# Patient Record
Sex: FEMALE | Race: BLACK OR AFRICAN AMERICAN
[De-identification: names, ages, dates, MRNs, and addresses within clinical notes are randomized per-mention and may not be internally consistent; named-entity substitution may affect disease eponyms.]

---

## 2018-06-22 ENCOUNTER — HOSPITAL ENCOUNTER (OUTPATIENT)
Dept: HOSPITAL 92 - LABBT | Age: 43
Discharge: HOME | End: 2018-06-22
Attending: OBSTETRICS & GYNECOLOGY
Payer: COMMERCIAL

## 2018-06-22 DIAGNOSIS — D25.9: ICD-10-CM

## 2018-06-22 DIAGNOSIS — N92.0: ICD-10-CM

## 2018-06-22 DIAGNOSIS — Z01.812: Primary | ICD-10-CM

## 2018-06-22 LAB
HGB BLD-MCNC: 8.5 G/DL (ref 12–16)
MCH RBC QN AUTO: 18.2 PG (ref 27–31)
MCV RBC AUTO: 60.7 FL (ref 78–98)
PLATELET # BLD AUTO: 523 THOU/UL (ref 130–400)
PREGS CONTROL BACKGROUND?: (no result)
PREGS CONTROL BAR APPEAR?: YES
RBC # BLD AUTO: 4.64 MILL/UL (ref 4.2–5.4)
WBC # BLD AUTO: 6.4 THOU/UL (ref 4.8–10.8)

## 2018-06-22 PROCEDURE — 86900 BLOOD TYPING SEROLOGIC ABO: CPT

## 2018-06-22 PROCEDURE — 85027 COMPLETE CBC AUTOMATED: CPT

## 2018-06-22 PROCEDURE — 86901 BLOOD TYPING SEROLOGIC RH(D): CPT

## 2018-06-22 PROCEDURE — 84703 CHORIONIC GONADOTROPIN ASSAY: CPT

## 2018-06-22 PROCEDURE — 86850 RBC ANTIBODY SCREEN: CPT

## 2018-06-25 ENCOUNTER — HOSPITAL ENCOUNTER (OUTPATIENT)
Dept: HOSPITAL 92 - SDC | Age: 43
LOS: 1 days | Discharge: HOME | End: 2018-06-26
Attending: OBSTETRICS & GYNECOLOGY
Payer: COMMERCIAL

## 2018-06-25 VITALS — BODY MASS INDEX: 28.8 KG/M2

## 2018-06-25 DIAGNOSIS — N83.8: ICD-10-CM

## 2018-06-25 DIAGNOSIS — N73.6: ICD-10-CM

## 2018-06-25 DIAGNOSIS — D64.9: ICD-10-CM

## 2018-06-25 DIAGNOSIS — D25.9: Primary | ICD-10-CM

## 2018-06-25 DIAGNOSIS — N84.0: ICD-10-CM

## 2018-06-25 PROCEDURE — 88307 TISSUE EXAM BY PATHOLOGIST: CPT

## 2018-06-25 PROCEDURE — 86901 BLOOD TYPING SEROLOGIC RH(D): CPT

## 2018-06-25 PROCEDURE — 85027 COMPLETE CBC AUTOMATED: CPT

## 2018-06-25 PROCEDURE — 86900 BLOOD TYPING SEROLOGIC ABO: CPT

## 2018-06-25 PROCEDURE — S0028 INJECTION, FAMOTIDINE, 20 MG: HCPCS

## 2018-06-25 PROCEDURE — 0UT94ZZ RESECTION OF UTERUS, PERCUTANEOUS ENDOSCOPIC APPROACH: ICD-10-PCS | Performed by: OBSTETRICS & GYNECOLOGY

## 2018-06-25 PROCEDURE — A4216 STERILE WATER/SALINE, 10 ML: HCPCS

## 2018-06-25 PROCEDURE — 96375 TX/PRO/DX INJ NEW DRUG ADDON: CPT

## 2018-06-25 PROCEDURE — 36415 COLL VENOUS BLD VENIPUNCTURE: CPT

## 2018-06-25 PROCEDURE — 0UT74ZZ RESECTION OF BILATERAL FALLOPIAN TUBES, PERCUTANEOUS ENDOSCOPIC APPROACH: ICD-10-PCS | Performed by: OBSTETRICS & GYNECOLOGY

## 2018-06-25 PROCEDURE — 86850 RBC ANTIBODY SCREEN: CPT

## 2018-06-25 PROCEDURE — A4306 DRUG DELIVERY SYSTEM <=50 ML: HCPCS

## 2018-06-25 PROCEDURE — 96374 THER/PROPH/DIAG INJ IV PUSH: CPT

## 2018-06-25 RX ADMIN — DOCUSATE CALCIUM SCH: 240 CAPSULE, LIQUID FILLED ORAL at 21:06

## 2018-06-25 RX ADMIN — Medication SCH: at 21:06

## 2018-06-25 NOTE — OP
DATE OF PROCEDURE:  2018

 

PREOPERATIVE DIAGNOSES:

1.  Uterine fibroids.

2.  Menometrorrhagia.

3.  Pelvic pain.

4.  Anemia.

 

POSTOPERATIVE DIAGNOSES:

1.  Uterine fibroids.

2.  Menometrorrhagia.

3.  Pelvic pain. 

4.  Anemia.

 

PROCEDURE:  Robotic-assisted total laparoscopic hysterectomy, bilateral salpingectomy.

 

ANESTHESIA:  General endotracheal.

 

ATTENDING SURGEON:  Cheli Gipson M.D.

 

ASSISTANT:  Kit Bauman M.D.

 

ESTIMATED BLOOD LOSS:  50 mL.

 

INTRAVENOUS FLUIDS:  1300 mL crystalloid.

 

URINE OUTPUT:  200 mL of clear urine.

 

COMPLICATIONS:  None.

 

PATHOLOGY:  Uterus, cervix, bilateral fallopian tubes.

 

DRAINS:  Paiz catheter.

 

FINDINGS:  Mobile 14-week size uterus sounded to 11 cm.  Normal uterine contours, normal appearing fa
llopian tubes and ovaries bilaterally.  Ureters were noted retroperitoneally throughout the entire ca
se vermiculating.  There were no bladder injury on backfilling of the bladder and excellent hemostasi
s on low pressure check.

 

OPERATIVE INDICATIONS:  A 42-year-old  with near constant vaginal bleeding and anemia, hemoglobi
n of 8.5 and uterine fibroids on ultrasound, that desired definitive management secondary to a submuc
ous location of fibroids and patient not being a good candidate for endometrial ablation and failing 
medical management with oral contraceptives and Lysteda.

 

OPERATIVE TECHNIQUE:  The patient was taken to the operating room where general anesthesia was obtain
ed without difficulty.  The patient was prepped and draped in sterile fashion in the dorsal lithotomy
 position.  A Paiz catheter was placed in the bladder; however, the 16 Fijian Piaz would not pass e
asily.  Therefore, a 14 Fijian Paiz was placed and 150 mL of urine returned.  The speculum was place
d in the vagina.  The anterior lip of the cervix was grasped with single tooth tenaculum.  The uterus
 then sounded to 11 cm and the MELVA manipulator was assembled with a 10 cm tip and a 4 cm colpotomize
r ring.  The manipulator tip was inserted to the uterine fundus.  The balloon was inflated.  The spec
ulum was removed and the colpotomizer ring was advanced to fit snugly around the cervix, an occluder 
balloon was then inflated.  Legs were placed in low lithotomy.  Attention was turned to the abdomen. 
 Approximately one fingerbreadth above the umbilicus was infiltrated with 0.5% Marcaine with epinephr
ine and a 12 mm skin incision was made.  The Veress needle was passed into the abdomen noting an open
ing pressure of 1 mmHg.  Pneumoperitoneum was obtained without difficulty.  A 12 mm trocar was passed
 into the abdomen and confirmed placement with robotic camera.  Steep Trendelenburg was obtained.  Ri
ght and left lower quadrant 8 mm robotic trocars were placed under direct visualization after infiltr
ating with 0.5% Marcaine with epinephrine.  A right upper quadrant assistant port that was 11 mm was 
also placed under direct visualization after infiltrating with anesthetic.  The robot was then docked
, the right robotic arm contained monopolar scissors, left robotic arm contained a fenestrated bipola
r.  The left fallopian tube was grasped and elevated.  There was a filmy adhesion to the epiploica th
at was transected with scissors.  The mesosalpinx was cauterized and transected and once the medial p
ortion was met, this was clamped across, cauterized and transected and removed out of the abdomen; ho
wever, the fallopian tube was dropped on the way out of removing it through the assistant port and th
e robotic camera was used to locate this.  However, during this process, the camera broke and this ne
cessitated undocking the camera, removing the instruments out of the abdomen, releasing pneumoperiton
eum and reassembling an additional robotic camera.  Once this was completed, the camera was redocked 
and the instruments were placed back into the abdomen.  The uteroovarian on the left side was cauteri
zed and transected and taken down to the round ligament that was cauterized and then portion transect
ed and opened up of the anterior and posterior leaf of the broad ligament.  The anterior leaf of the 
broad ligament was incised and tenting up and the retroperitoneum with the fenestrated bipolar down t
o the level of the bladder flap.  The posterior leaf of broad ligament was also dropped down to the l
evel of the uterosacral and the posterior retroperitoneal fibers were dissected through with scissors
, also blunt dissection push and spread with the fenestrated.  The uterine vessels were skeletonized 
on the left side and attention was turned to the right side of the uterus where the fallopian tube wa
s grasped and elevated.  A window was made in the mesosalpinx and the vessels were cauterized and the
 fallopian tube was then transected and removed out of the abdomen.  The utero-ovarian was cauterized
 x2 with the fenestrated and transected in the midportion taken down to the round ligament with the s
cissors and that was cauterized in the midportion and transected.  There were large vessels near the 
round ligament on the right side with slight bleeding; however, this was hemostatic with the fenestra
goyo bipolar.  The anterior leaf of the broad ligament was opened up with the scissors down to the lev
el of the incision from the contralateral side.  The posterior leaf of the broad ligament was also in
cised down to the level of the uterosacral and the ureter was noted on the right and the right pelvic
 sidewall and the retroperitoneum.  Further retroperitoneal dissection was performed bluntly and the 
uterine vessels on the right side were skeletonized with the scissors until adequate visualization wa
s noted.  The vessels on the right side were then clamped and cauterized at the level of the internal
 cervical os.  The bladder flap was then further created scoring on the pubocervical fascia and disse
cting down distally with the back end of the scissors.  The vessels on the left side were clamped and
 cauterized at the level of the internal cervical os.  Posterior colpotomy was performed and the colp
otomizer ring was noted.  Anterior colpotomy was then performed and the uterine pedicles remained.  T
hese were again clamped as close to the uterus as possible and transected at the level of the lower u
terine segment to allow the pedicle to fall well away from the vaginal cuff apex bilaterally.  Hemost
asis was noted.  The uterus was then removed into the vagina as a means to maintain pneumoperitoneum 
and the cuff was irrigated and hemostasis was achieved with the fenestrated.  The scissors were trade
d out for the needle  and the 2-0 barbed PDS suture was used to close the vaginal cuff in a run
susan fashion incorporating vaginal mucosa into each bite and posterior peritoneum.  This was run back
 for half of the length of the vaginal cuff and closure was noted to be excellent.  The needle was re
moved out of the abdomen.  Irrigation was again performed of the vaginal cuff and low pressure check 
was performed as well as backfilling of the bladder.  At this time, the single lumen ON-Q catheter wa
s threaded in the mid portion of the abdomen and placed down along the vaginal cuff, securing the cat
heter underneath the ovaries.  ____ test was performed and success EF flowed easily at that time.  Al
l instruments were removed out of the abdomen.  The robot was undocked.  Pneumoperitoneum was release
d.  The fascia of the umbilical port was closed with an 0 PDS in a figure-of-eight fashion.  The skin
 was closed with 4-0 Monocryl in subcuticular fashion.  Dermabond was applied.  The vaginal cuff was 
checked and noted to have excellent closure and no active bleeding.  All instruments were removed out
 of the vagina.  The patient tolerated procedure well.  Sponge and needle counts were correct x2.  Th
e patient was taken to recovery room in stable condition.  The patient received Ancef 2 grams prior t
o the procedure.

## 2018-06-26 VITALS — TEMPERATURE: 97.8 F | DIASTOLIC BLOOD PRESSURE: 77 MMHG | SYSTOLIC BLOOD PRESSURE: 144 MMHG

## 2018-06-26 LAB
HGB BLD-MCNC: 7.7 G/DL (ref 12–16)
MCH RBC QN AUTO: 18.4 PG (ref 27–31)
MCV RBC AUTO: 62.4 FL (ref 78–98)
PLATELET # BLD AUTO: 448 THOU/UL (ref 130–400)
RBC # BLD AUTO: 4.18 MILL/UL (ref 4.2–5.4)
WBC # BLD AUTO: 11.2 THOU/UL (ref 4.8–10.8)

## 2018-06-26 RX ADMIN — DOCUSATE CALCIUM SCH MG: 240 CAPSULE, LIQUID FILLED ORAL at 08:21

## 2018-06-26 RX ADMIN — Medication SCH ML: at 08:21

## 2018-06-27 NOTE — DIS
ADMISSION DIAGNOSES:

1.  Uterine fibroids.

2.  Menometrorrhagia.

3.  Pelvic pain.

4.  Anemia.

 

DISCHARGE DIAGNOSES:  1.  Status post robotic assisted total laparoscopic hysterectomy and bilateral 
salpingectomy.

 

DISCHARGE CONDITION:  Stable.

 

ATTENDING PHYSICIAN:  Cheli Gipson M.D.

 

CONSULTATIONS:  None.

 

PROCEDURES/OPERATIONS:  As listed in discharge diagnoses.

 

HISTORY AND PHYSICAL EXAMINATION:  Please see previously dictated H&P.

 

HOSPITAL COURSE:  A 42-year-old presented to day stay to undergo scheduled surgery as above.  She had
 an uncomplicated procedure with estimated blood loss of 50 mL.  Postoperatively, she went to the [facility]
or.  She had normal urine output and vital signs that were within normal limits her entire stay.  Her
 pain was initially controlled with an ON-Q pain pump as well as supplemental Toradol.  She was then 
transitioned to p.o. hydrocodone which controlled her pain very well.  She was initially anemic start
ing with a hemoglobin of 8.5.  Her postoperative hemoglobin was appropriate at 7.7 and she ambulated 
without any signs or symptoms of anemia.  She tolerated a regular diet and passed flatus and voided w
ithout difficulty prior to discharge.  Her exam was benign with incisions that were clean, dry and in
tact.  She was given thorough instructions on how to care for her ON-Q pain pump on discharge as well
 as any warning signs to return to ER or call the office.  Her final pathology is pending at the time
 of discharge and she will follow up with me in 2 weeks postoperative time.  She was given prescripti
ons for Norco and ibuprofen and at a later date will start iron supplementation for her anemia.